# Patient Record
Sex: MALE | Race: ASIAN | NOT HISPANIC OR LATINO | Employment: FULL TIME | URBAN - METROPOLITAN AREA
[De-identification: names, ages, dates, MRNs, and addresses within clinical notes are randomized per-mention and may not be internally consistent; named-entity substitution may affect disease eponyms.]

---

## 2022-01-13 ENCOUNTER — HOSPITAL ENCOUNTER (OUTPATIENT)
Dept: LAB | Facility: MEDICAL CENTER | Age: 52
End: 2022-01-13
Attending: ORTHOPAEDIC SURGERY
Payer: COMMERCIAL

## 2022-01-13 LAB — URATE SERPL-MCNC: 6.7 MG/DL (ref 2.5–8.3)

## 2022-01-13 PROCEDURE — 36415 COLL VENOUS BLD VENIPUNCTURE: CPT

## 2022-01-13 PROCEDURE — 84550 ASSAY OF BLOOD/URIC ACID: CPT

## 2024-02-03 ENCOUNTER — HOSPITAL ENCOUNTER (OUTPATIENT)
Facility: MEDICAL CENTER | Age: 54
End: 2024-02-04
Attending: STUDENT IN AN ORGANIZED HEALTH CARE EDUCATION/TRAINING PROGRAM | Admitting: INTERNAL MEDICINE
Payer: COMMERCIAL

## 2024-02-03 ENCOUNTER — APPOINTMENT (OUTPATIENT)
Dept: RADIOLOGY | Facility: MEDICAL CENTER | Age: 54
End: 2024-02-03
Attending: STUDENT IN AN ORGANIZED HEALTH CARE EDUCATION/TRAINING PROGRAM
Payer: COMMERCIAL

## 2024-02-03 DIAGNOSIS — I16.0 HYPERTENSIVE URGENCY: ICD-10-CM

## 2024-02-03 DIAGNOSIS — I77.73 RENAL ARTERY DISSECTION (HCC): ICD-10-CM

## 2024-02-03 DIAGNOSIS — R07.9 CHEST PAIN, UNSPECIFIED TYPE: ICD-10-CM

## 2024-02-03 LAB
ALBUMIN SERPL BCP-MCNC: 4.9 G/DL (ref 3.2–4.9)
ALBUMIN/GLOB SERPL: 1.5 G/DL
ALP SERPL-CCNC: 70 U/L (ref 30–99)
ALT SERPL-CCNC: 22 U/L (ref 2–50)
ANION GAP SERPL CALC-SCNC: 16 MMOL/L (ref 7–16)
AST SERPL-CCNC: 39 U/L (ref 12–45)
BASOPHILS # BLD AUTO: 0.8 % (ref 0–1.8)
BASOPHILS # BLD: 0.08 K/UL (ref 0–0.12)
BILIRUB SERPL-MCNC: 0.5 MG/DL (ref 0.1–1.5)
BUN SERPL-MCNC: 17 MG/DL (ref 8–22)
CALCIUM ALBUM COR SERPL-MCNC: 8.9 MG/DL (ref 8.5–10.5)
CALCIUM SERPL-MCNC: 9.6 MG/DL (ref 8.4–10.2)
CHLORIDE SERPL-SCNC: 95 MMOL/L (ref 96–112)
CO2 SERPL-SCNC: 24 MMOL/L (ref 20–33)
CREAT SERPL-MCNC: 0.87 MG/DL (ref 0.5–1.4)
EKG IMPRESSION: NORMAL
EOSINOPHIL # BLD AUTO: 0.04 K/UL (ref 0–0.51)
EOSINOPHIL NFR BLD: 0.4 % (ref 0–6.9)
ERYTHROCYTE [DISTWIDTH] IN BLOOD BY AUTOMATED COUNT: 39.4 FL (ref 35.9–50)
GFR SERPLBLD CREATININE-BSD FMLA CKD-EPI: 103 ML/MIN/1.73 M 2
GLOBULIN SER CALC-MCNC: 3.2 G/DL (ref 1.9–3.5)
GLUCOSE SERPL-MCNC: 90 MG/DL (ref 65–99)
HCT VFR BLD AUTO: 48.1 % (ref 42–52)
HGB BLD-MCNC: 16.6 G/DL (ref 14–18)
IMM GRANULOCYTES # BLD AUTO: 0.06 K/UL (ref 0–0.11)
IMM GRANULOCYTES NFR BLD AUTO: 0.6 % (ref 0–0.9)
LYMPHOCYTES # BLD AUTO: 1.55 K/UL (ref 1–4.8)
LYMPHOCYTES NFR BLD: 15.2 % (ref 22–41)
MCH RBC QN AUTO: 32.7 PG (ref 27–33)
MCHC RBC AUTO-ENTMCNC: 34.5 G/DL (ref 32.3–36.5)
MCV RBC AUTO: 94.7 FL (ref 81.4–97.8)
MONOCYTES # BLD AUTO: 0.73 K/UL (ref 0–0.85)
MONOCYTES NFR BLD AUTO: 7.1 % (ref 0–13.4)
NEUTROPHILS # BLD AUTO: 7.76 K/UL (ref 1.82–7.42)
NEUTROPHILS NFR BLD: 75.9 % (ref 44–72)
NRBC # BLD AUTO: 0 K/UL
NRBC BLD-RTO: 0 /100 WBC (ref 0–0.2)
PLATELET # BLD AUTO: 242 K/UL (ref 164–446)
PMV BLD AUTO: 8.8 FL (ref 9–12.9)
POTASSIUM SERPL-SCNC: 3.7 MMOL/L (ref 3.6–5.5)
PROT SERPL-MCNC: 8.1 G/DL (ref 6–8.2)
RBC # BLD AUTO: 5.08 M/UL (ref 4.7–6.1)
SODIUM SERPL-SCNC: 135 MMOL/L (ref 135–145)
TROPONIN T SERPL-MCNC: 6 NG/L (ref 6–19)
TROPONIN T SERPL-MCNC: 6 NG/L (ref 6–19)
WBC # BLD AUTO: 10.2 K/UL (ref 4.8–10.8)

## 2024-02-03 PROCEDURE — 96374 THER/PROPH/DIAG INJ IV PUSH: CPT

## 2024-02-03 PROCEDURE — 36415 COLL VENOUS BLD VENIPUNCTURE: CPT

## 2024-02-03 PROCEDURE — 700111 HCHG RX REV CODE 636 W/ 250 OVERRIDE (IP): Performed by: STUDENT IN AN ORGANIZED HEALTH CARE EDUCATION/TRAINING PROGRAM

## 2024-02-03 PROCEDURE — G0378 HOSPITAL OBSERVATION PER HR: HCPCS

## 2024-02-03 PROCEDURE — A9270 NON-COVERED ITEM OR SERVICE: HCPCS | Performed by: STUDENT IN AN ORGANIZED HEALTH CARE EDUCATION/TRAINING PROGRAM

## 2024-02-03 PROCEDURE — 99223 1ST HOSP IP/OBS HIGH 75: CPT | Performed by: INTERNAL MEDICINE

## 2024-02-03 PROCEDURE — 99285 EMERGENCY DEPT VISIT HI MDM: CPT

## 2024-02-03 PROCEDURE — 84484 ASSAY OF TROPONIN QUANT: CPT | Mod: 91

## 2024-02-03 PROCEDURE — 96376 TX/PRO/DX INJ SAME DRUG ADON: CPT

## 2024-02-03 PROCEDURE — 93005 ELECTROCARDIOGRAM TRACING: CPT

## 2024-02-03 PROCEDURE — 85025 COMPLETE CBC W/AUTO DIFF WBC: CPT

## 2024-02-03 PROCEDURE — 71275 CT ANGIOGRAPHY CHEST: CPT

## 2024-02-03 PROCEDURE — 93005 ELECTROCARDIOGRAM TRACING: CPT | Performed by: STUDENT IN AN ORGANIZED HEALTH CARE EDUCATION/TRAINING PROGRAM

## 2024-02-03 PROCEDURE — 80053 COMPREHEN METABOLIC PANEL: CPT

## 2024-02-03 PROCEDURE — 700102 HCHG RX REV CODE 250 W/ 637 OVERRIDE(OP): Performed by: STUDENT IN AN ORGANIZED HEALTH CARE EDUCATION/TRAINING PROGRAM

## 2024-02-03 PROCEDURE — 700117 HCHG RX CONTRAST REV CODE 255: Performed by: STUDENT IN AN ORGANIZED HEALTH CARE EDUCATION/TRAINING PROGRAM

## 2024-02-03 RX ORDER — LABETALOL HYDROCHLORIDE 5 MG/ML
20 INJECTION, SOLUTION INTRAVENOUS EVERY 4 HOURS PRN
Status: DISCONTINUED | OUTPATIENT
Start: 2024-02-03 | End: 2024-02-04 | Stop reason: HOSPADM

## 2024-02-03 RX ORDER — ASPIRIN 300 MG/1
300 SUPPOSITORY RECTAL DAILY
Status: DISCONTINUED | OUTPATIENT
Start: 2024-02-04 | End: 2024-02-04 | Stop reason: HOSPADM

## 2024-02-03 RX ORDER — PROMETHAZINE HYDROCHLORIDE 25 MG/1
12.5-25 SUPPOSITORY RECTAL EVERY 4 HOURS PRN
Status: DISCONTINUED | OUTPATIENT
Start: 2024-02-03 | End: 2024-02-04 | Stop reason: HOSPADM

## 2024-02-03 RX ORDER — LABETALOL HYDROCHLORIDE 5 MG/ML
20 INJECTION, SOLUTION INTRAVENOUS ONCE
Status: COMPLETED | OUTPATIENT
Start: 2024-02-03 | End: 2024-02-03

## 2024-02-03 RX ORDER — ASPIRIN 325 MG
325 TABLET ORAL ONCE
Status: DISCONTINUED | OUTPATIENT
Start: 2024-02-03 | End: 2024-02-03

## 2024-02-03 RX ORDER — NITROGLYCERIN 0.4 MG/1
0.4 TABLET SUBLINGUAL
Status: DISCONTINUED | OUTPATIENT
Start: 2024-02-03 | End: 2024-02-04 | Stop reason: HOSPADM

## 2024-02-03 RX ORDER — LABETALOL HYDROCHLORIDE 5 MG/ML
10 INJECTION, SOLUTION INTRAVENOUS ONCE
Status: COMPLETED | OUTPATIENT
Start: 2024-02-03 | End: 2024-02-03

## 2024-02-03 RX ORDER — ONDANSETRON 4 MG/1
4 TABLET, ORALLY DISINTEGRATING ORAL EVERY 4 HOURS PRN
Status: DISCONTINUED | OUTPATIENT
Start: 2024-02-03 | End: 2024-02-04 | Stop reason: HOSPADM

## 2024-02-03 RX ORDER — PROCHLORPERAZINE EDISYLATE 5 MG/ML
5-10 INJECTION INTRAMUSCULAR; INTRAVENOUS EVERY 4 HOURS PRN
Status: DISCONTINUED | OUTPATIENT
Start: 2024-02-03 | End: 2024-02-04 | Stop reason: HOSPADM

## 2024-02-03 RX ORDER — LISINOPRIL 5 MG/1
10 TABLET ORAL
Status: DISCONTINUED | OUTPATIENT
Start: 2024-02-04 | End: 2024-02-04 | Stop reason: HOSPADM

## 2024-02-03 RX ORDER — PROMETHAZINE HYDROCHLORIDE 25 MG/1
12.5-25 TABLET ORAL EVERY 4 HOURS PRN
Status: DISCONTINUED | OUTPATIENT
Start: 2024-02-03 | End: 2024-02-04 | Stop reason: HOSPADM

## 2024-02-03 RX ORDER — NITROGLYCERIN 0.4 MG/1
0.4 TABLET SUBLINGUAL ONCE
Status: COMPLETED | OUTPATIENT
Start: 2024-02-03 | End: 2024-02-03

## 2024-02-03 RX ORDER — CARVEDILOL 6.25 MG/1
6.25 TABLET ORAL 2 TIMES DAILY WITH MEALS
Status: DISCONTINUED | OUTPATIENT
Start: 2024-02-04 | End: 2024-02-04 | Stop reason: HOSPADM

## 2024-02-03 RX ORDER — ASPIRIN 81 MG/1
324 TABLET, CHEWABLE ORAL DAILY
Status: DISCONTINUED | OUTPATIENT
Start: 2024-02-04 | End: 2024-02-04 | Stop reason: HOSPADM

## 2024-02-03 RX ORDER — ASPIRIN 325 MG
325 TABLET ORAL ONCE
Status: COMPLETED | OUTPATIENT
Start: 2024-02-03 | End: 2024-02-03

## 2024-02-03 RX ORDER — ENALAPRILAT 1.25 MG/ML
1.25 INJECTION INTRAVENOUS EVERY 6 HOURS PRN
Status: DISCONTINUED | OUTPATIENT
Start: 2024-02-03 | End: 2024-02-04

## 2024-02-03 RX ORDER — ACETAMINOPHEN 325 MG/1
650 TABLET ORAL EVERY 6 HOURS PRN
Status: DISCONTINUED | OUTPATIENT
Start: 2024-02-03 | End: 2024-02-04 | Stop reason: HOSPADM

## 2024-02-03 RX ORDER — ONDANSETRON 2 MG/ML
4 INJECTION INTRAMUSCULAR; INTRAVENOUS EVERY 4 HOURS PRN
Status: DISCONTINUED | OUTPATIENT
Start: 2024-02-03 | End: 2024-02-04 | Stop reason: HOSPADM

## 2024-02-03 RX ORDER — ASPIRIN 325 MG
325 TABLET ORAL DAILY
Status: DISCONTINUED | OUTPATIENT
Start: 2024-02-04 | End: 2024-02-04 | Stop reason: HOSPADM

## 2024-02-03 RX ADMIN — ASPIRIN 325 MG: 325 TABLET ORAL at 23:27

## 2024-02-03 RX ADMIN — LABETALOL HYDROCHLORIDE 10 MG: 5 INJECTION, SOLUTION INTRAVENOUS at 22:28

## 2024-02-03 RX ADMIN — IOHEXOL 100 ML: 350 INJECTION, SOLUTION INTRAVENOUS at 21:51

## 2024-02-03 RX ADMIN — LABETALOL HYDROCHLORIDE 20 MG: 5 INJECTION, SOLUTION INTRAVENOUS at 22:59

## 2024-02-03 RX ADMIN — NITROGLYCERIN 0.4 MG: 0.4 TABLET, ORALLY DISINTEGRATING SUBLINGUAL at 21:37

## 2024-02-04 ENCOUNTER — APPOINTMENT (OUTPATIENT)
Dept: RADIOLOGY | Facility: MEDICAL CENTER | Age: 54
End: 2024-02-04
Attending: INTERNAL MEDICINE
Payer: COMMERCIAL

## 2024-02-04 VITALS
RESPIRATION RATE: 15 BRPM | WEIGHT: 147.71 LBS | SYSTOLIC BLOOD PRESSURE: 126 MMHG | HEART RATE: 74 BPM | HEIGHT: 68 IN | OXYGEN SATURATION: 95 % | DIASTOLIC BLOOD PRESSURE: 78 MMHG | BODY MASS INDEX: 22.39 KG/M2 | TEMPERATURE: 98.2 F

## 2024-02-04 PROBLEM — R07.9 CHEST PAIN: Status: RESOLVED | Noted: 2024-02-03 | Resolved: 2024-02-04

## 2024-02-04 PROBLEM — I16.0 HYPERTENSIVE URGENCY: Status: RESOLVED | Noted: 2024-02-04 | Resolved: 2024-02-04

## 2024-02-04 PROBLEM — F10.10 ALCOHOL ABUSE: Status: ACTIVE | Noted: 2024-02-04

## 2024-02-04 PROBLEM — I77.73 RENAL ARTERY DISSECTION (HCC): Status: ACTIVE | Noted: 2024-02-04

## 2024-02-04 PROBLEM — I16.0 HYPERTENSIVE URGENCY: Status: ACTIVE | Noted: 2024-02-04

## 2024-02-04 LAB
CHOLEST SERPL-MCNC: 215 MG/DL (ref 100–199)
HDLC SERPL-MCNC: 64 MG/DL
LDLC SERPL CALC-MCNC: 90 MG/DL
TRIGL SERPL-MCNC: 303 MG/DL (ref 0–149)
TROPONIN T SERPL-MCNC: 8 NG/L (ref 6–19)

## 2024-02-04 PROCEDURE — 84484 ASSAY OF TROPONIN QUANT: CPT

## 2024-02-04 PROCEDURE — A9502 TC99M TETROFOSMIN: HCPCS

## 2024-02-04 PROCEDURE — A9270 NON-COVERED ITEM OR SERVICE: HCPCS | Performed by: INTERNAL MEDICINE

## 2024-02-04 PROCEDURE — 700111 HCHG RX REV CODE 636 W/ 250 OVERRIDE (IP): Performed by: INTERNAL MEDICINE

## 2024-02-04 PROCEDURE — G0378 HOSPITAL OBSERVATION PER HR: HCPCS

## 2024-02-04 PROCEDURE — 93018 CV STRESS TEST I&R ONLY: CPT | Performed by: INTERNAL MEDICINE

## 2024-02-04 PROCEDURE — 96375 TX/PRO/DX INJ NEW DRUG ADDON: CPT

## 2024-02-04 PROCEDURE — 80061 LIPID PANEL: CPT

## 2024-02-04 PROCEDURE — 78452 HT MUSCLE IMAGE SPECT MULT: CPT | Mod: 26 | Performed by: INTERNAL MEDICINE

## 2024-02-04 PROCEDURE — 94760 N-INVAS EAR/PLS OXIMETRY 1: CPT

## 2024-02-04 PROCEDURE — 99239 HOSP IP/OBS DSCHRG MGMT >30: CPT | Performed by: INTERNAL MEDICINE

## 2024-02-04 PROCEDURE — 700102 HCHG RX REV CODE 250 W/ 637 OVERRIDE(OP): Performed by: INTERNAL MEDICINE

## 2024-02-04 PROCEDURE — 36415 COLL VENOUS BLD VENIPUNCTURE: CPT

## 2024-02-04 RX ORDER — ENOXAPARIN SODIUM 100 MG/ML
40 INJECTION SUBCUTANEOUS DAILY
Status: DISCONTINUED | OUTPATIENT
Start: 2024-02-04 | End: 2024-02-04 | Stop reason: HOSPADM

## 2024-02-04 RX ORDER — CARVEDILOL 6.25 MG/1
6.25 TABLET ORAL 2 TIMES DAILY WITH MEALS
Qty: 60 TABLET | Refills: 0 | Status: SHIPPED | OUTPATIENT
Start: 2024-02-04 | End: 2024-02-06 | Stop reason: SDUPTHER

## 2024-02-04 RX ORDER — LISINOPRIL 10 MG/1
10 TABLET ORAL DAILY
Qty: 30 TABLET | Refills: 0 | Status: SHIPPED | OUTPATIENT
Start: 2024-02-05 | End: 2024-02-06 | Stop reason: SDUPTHER

## 2024-02-04 RX ORDER — ENALAPRILAT 1.25 MG/ML
1.25 INJECTION INTRAVENOUS EVERY 6 HOURS PRN
Status: DISCONTINUED | OUTPATIENT
Start: 2024-02-04 | End: 2024-02-04 | Stop reason: HOSPADM

## 2024-02-04 RX ORDER — LISINOPRIL 10 MG/1
10 TABLET ORAL DAILY
Qty: 30 TABLET | Refills: 0 | Status: SHIPPED | OUTPATIENT
Start: 2024-02-05 | End: 2024-02-04

## 2024-02-04 RX ORDER — CARVEDILOL 6.25 MG/1
6.25 TABLET ORAL 2 TIMES DAILY WITH MEALS
Qty: 60 TABLET | Refills: 0 | Status: SHIPPED | OUTPATIENT
Start: 2024-02-04 | End: 2024-02-04

## 2024-02-04 RX ADMIN — LISINOPRIL 10 MG: 5 TABLET ORAL at 00:01

## 2024-02-04 RX ADMIN — ENALAPRILAT 1.25 MG: 1.25 INJECTION INTRAVENOUS at 01:02

## 2024-02-04 ASSESSMENT — ENCOUNTER SYMPTOMS
STRIDOR: 0
CHILLS: 0
TINGLING: 0
NAUSEA: 0
PALPITATIONS: 0
HEADACHES: 0
DIZZINESS: 0
FALLS: 0
DEPRESSION: 0
WEAKNESS: 0
SPUTUM PRODUCTION: 0
SHORTNESS OF BREATH: 0
CONSTIPATION: 0
VOMITING: 0
COUGH: 0
ABDOMINAL PAIN: 0
DIARRHEA: 0
MYALGIAS: 0
LOSS OF CONSCIOUSNESS: 0
FEVER: 0

## 2024-02-04 ASSESSMENT — PAIN DESCRIPTION - PAIN TYPE
TYPE: ACUTE PAIN
TYPE: ACUTE PAIN

## 2024-02-04 ASSESSMENT — COGNITIVE AND FUNCTIONAL STATUS - GENERAL
SUGGESTED CMS G CODE MODIFIER MOBILITY: CH
DAILY ACTIVITIY SCORE: 24
MOBILITY SCORE: 24
SUGGESTED CMS G CODE MODIFIER DAILY ACTIVITY: CH

## 2024-02-04 ASSESSMENT — LIFESTYLE VARIABLES
AVERAGE NUMBER OF DAYS PER WEEK YOU HAVE A DRINK CONTAINING ALCOHOL: 6
EVER HAD A DRINK FIRST THING IN THE MORNING TO STEADY YOUR NERVES TO GET RID OF A HANGOVER: NO
TOTAL SCORE: 2
CONSUMPTION TOTAL: POSITIVE
HAVE PEOPLE ANNOYED YOU BY CRITICIZING YOUR DRINKING: NO
TOTAL SCORE: 2
HAVE YOU EVER FELT YOU SHOULD CUT DOWN ON YOUR DRINKING: YES
EVER FELT BAD OR GUILTY ABOUT YOUR DRINKING: YES
ALCOHOL_USE: YES
TOTAL SCORE: 2
HOW MANY TIMES IN THE PAST YEAR HAVE YOU HAD 5 OR MORE DRINKS IN A DAY: 30
ON A TYPICAL DAY WHEN YOU DRINK ALCOHOL HOW MANY DRINKS DO YOU HAVE: 3

## 2024-02-04 ASSESSMENT — FIBROSIS 4 INDEX
FIB4 SCORE: 1.82
FIB4 SCORE: 1.82

## 2024-02-04 ASSESSMENT — PATIENT HEALTH QUESTIONNAIRE - PHQ9
SUM OF ALL RESPONSES TO PHQ9 QUESTIONS 1 AND 2: 0
1. LITTLE INTEREST OR PLEASURE IN DOING THINGS: NOT AT ALL
2. FEELING DOWN, DEPRESSED, IRRITABLE, OR HOPELESS: NOT AT ALL

## 2024-02-04 NOTE — DISCHARGE SUMMARY
"Discharge Summary    CHIEF COMPLAINT ON ADMISSION  Chief Complaint   Patient presents with    Chest Pressure     Pt Aox4, ambulatory, report chest pressure on upper left side started 3pm today while working. Took his BP and it was high. Denies dizziness, weakness. No N/V.        Reason for Admission  Chest Pressure     Admission Date  2/3/2024    CODE STATUS  Full Code    HPI & HOSPITAL COURSE  Billy Ewing is a 53 y.o. male admitted 2/3/2024 with chest pressure. Troponin was negative x 3 . EKG showed sinus rhythm with no signs of acute ischemia.    CTA chest from 2/3/2024 showed ascending thoracic aortic ectasia measuring 4.2 cm in diameter, no thoracic or abdominal aortic dissection, and focal nonocclusive dissection involving LEFT renal artery. Per chart, the ER physician discussed the case with  Vascular Surgery who recommend keeping the systolic blood pressure closer to 140. Coreg and Lisinopril were started.     NM stress test from today showed \"no evidence of significant jeopardized viable myocardium or prior myocardial infarction. Normal left ventricular size, ejection fraction, and wall motion   ...Negative stress ECG for ischemia\"    Patient has not has chest pain since admission. He is afebrile and hemodynamically stable.    Patient has been advised to obtain a blood pressure machine and record his blood pressure at least 3 times a day and provide the readings to his primary care physician    Patient has been advised to follow up with his PCP in 2-3 days.    Therefore, he is discharged in fair and stable condition to home with close outpatient follow-up.      Discharge Date  2/4/2024    FOLLOW UP ITEMS POST DISCHARGE  PCP in 2-3 days    DISCHARGE DIAGNOSES  Principal Problem (Resolved):    Chest pain (POA: Yes)  Active Problems:    Renal artery dissection (HCC) (POA: Yes)    Alcohol abuse (POA: Yes)  Resolved Problems:    Hypertensive urgency (POA: Yes)      FOLLOW UP  No future appointments.  No " follow-up provider specified.    MEDICATIONS ON DISCHARGE     Medication List        START taking these medications        Instructions   carvedilol 6.25 MG Tabs  Commonly known as: Coreg   Take 1 Tablet by mouth 2 times a day with meals.  Dose: 6.25 mg     lisinopril 10 MG Tabs  Start taking on: February 5, 2024  Commonly known as: Prinivil   Take 1 Tablet by mouth every day.  Dose: 10 mg            CONTINUE taking these medications        Instructions   multivitamin Tabs   Take 1 Tablet by mouth every day.  Dose: 1 Tablet              Allergies  No Known Allergies    DIET  Orders Placed This Encounter   Procedures    Diet Order Diet: Cardiac; Miscellaneous modifications: (optional): No Decaf, No Caffeine(for test)     Standing Status:   Standing     Number of Occurrences:   1     Order Specific Question:   Diet:     Answer:   Cardiac [6]     Order Specific Question:   Miscellaneous modifications: (optional)     Answer:   No Decaf, No Caffeine(for test) [11]       ACTIVITY  As tolerated.      CONSULTATIONS  None    PROCEDURES  NM stress test    LABORATORY  Lab Results   Component Value Date    SODIUM 135 02/03/2024    POTASSIUM 3.7 02/03/2024    CHLORIDE 95 (L) 02/03/2024    CO2 24 02/03/2024    GLUCOSE 90 02/03/2024    BUN 17 02/03/2024    CREATININE 0.87 02/03/2024        Lab Results   Component Value Date    WBC 10.2 02/03/2024    HEMOGLOBIN 16.6 02/03/2024    HEMATOCRIT 48.1 02/03/2024    PLATELETCT 242 02/03/2024        Total time of the discharge process exceeds 38 minutes.

## 2024-02-04 NOTE — ASSESSMENT & PLAN NOTE
Patient systolic blood pressure was nearly 200 initially  Has improved with as needed treatment  ERP did discuss the case with vascular surgery who recommended keeping the systolic blood pressure closer to 140  Because of this, I will start scheduled lisinopril and Coreg  Start as needed labetalol and enalapril  Adjust as needed

## 2024-02-04 NOTE — ASSESSMENT & PLAN NOTE
Patient states he drinks 3-4 alcoholic beverages a day  No history of withdrawal  I think it is less likely that this is esophagitis from alcohol due to the resolution of the pain as well as the description of the pain however I will start as needed GI cocktail  Monitor for withdrawal, no sign at this time

## 2024-02-04 NOTE — PROGRESS NOTES
Medication history reviewed with pt. Med rec is complete.  Allergies reviewed, per pt    Pt reports no prescription medications in the last 30 days or longer.    Patient has not had any outpatient antibiotics in the last 30 days.    Pt is not on any anticoagulants

## 2024-02-04 NOTE — ED PROVIDER NOTES
ED Provider Note    CHIEF COMPLAINT  Chief Complaint   Patient presents with    Chest Pressure     Pt Aox4, ambulatory, report chest pressure on upper left side started 3pm today while working. Took his BP and it was high. Denies dizziness, weakness. No N/V.        EXTERNAL RECORDS REVIEWED  Outpatient Notes urgent care visit from 12/12/2023 where patient was evaluated for rash vital signs notable for hypertension    HPI/ROS  LIMITATION TO HISTORY     OUTSIDE HISTORIAN(S):      Billy Ewing is a 53 y.o. male who presents with chest pressure.  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  Patient says that pain started around 3 PM.  Patient says that pain was more severe earlier has improved but is ongoing.  Patient says he did have some diaphoresis but was in a kitchen.  Patient denies vomiting, shortness of breath, headache, neck pain, back pain, pain radiation, abdominal pain.  Patient denies previous history of similar symptoms.  Patient denies tobacco use.  Patient says he does not go to the doctor no known medical problems.  Patient denies drug use.  Patient denies recent trauma.  Patient says that he believes his mother had cardiac bypass when she was in her 70s.    PAST MEDICAL HISTORY       SURGICAL HISTORY  patient denies any surgical history    FAMILY HISTORY  History reviewed. No pertinent family history.    SOCIAL HISTORY  Social History     Tobacco Use    Smoking status: Never    Smokeless tobacco: Never   Vaping Use    Vaping Use: Never used   Substance and Sexual Activity    Alcohol use: Yes     Alcohol/week: 15.6 oz     Types: 21 Glasses of wine, 5 Cans of beer per week     Comment: drinks everyday    Drug use: Never    Sexual activity: Not on file       CURRENT MEDICATIONS  Home Medications       Reviewed by Lenka Mart R.N. (Registered Nurse) on 02/03/24 at 2131  Med List Status: Complete     Medication Last Dose Status       "  Patient Aayush Taking any Medications                           ALLERGIES  No Known Allergies    PHYSICAL EXAM  VITAL SIGNS: BP (!) 170/106   Pulse 77   Temp 37.4 °C (99.4 °F) (Temporal)   Resp (!) 23   Ht 1.727 m (5' 8\")   Wt 68.8 kg (151 lb 10.8 oz)   SpO2 95%   BMI 23.06 kg/m²    Constitutional: Alert in no apparent distress.  HENT: No signs of trauma, Bilateral external ears normal, Nose normal.   Eyes: Pupils are equal and reactive, Conjunctiva normal, Non-icteric.   Neck: Normal range of motion, No tenderness, Supple, No stridor.   Cardiovascular: Regular rate and rhythm, no murmurs.   Thorax & Lungs: Normal breath sounds, No respiratory distress, No wheezing, No chest tenderness.   Abdomen: Bowel sounds normal, Soft, No tenderness, No masses, No pulsatile masses.   Skin: Warm, Dry, No erythema, No rash.   Back: No bony tenderness, No CVA tenderness.   Extremities: Intact distal pulses, No edema, No tenderness, No cyanosis  Musculoskeletal: Good range of motion in all major joints. No tenderness to palpation or major deformities noted.   Neurologic: Alert , Normal motor function, Normal sensory function, No focal deficits noted.       DIAGNOSTIC STUDIES / PROCEDURES  EKG  I have independently interpreted this EKG  Results for orders placed or performed during the hospital encounter of 24   EKG   Result Value Ref Range    Report       [facility] Emergency Dept.    Test Date:  2024  Pt Name:    MARYBETH SANCHEZ              Department: [facility]  MRN:        [id number]                      Room:  Gender:     Male                         Technician: FABIANA  :        1970                   Requested By:ER TRIAGE PROTOCOL  Order #:    [id number]                    Reading MD: Daryl Roman    Measurements  Intervals                                Axis  Rate:       88                           P:          68  WV:         177                          QRS:        " -8  QRSD:       103                          T:          32  QT:         372  QTc:        450    Interpretive Statements    Interpreted by me: Sinus rhythm, rate 88, slight prolongation of QRS, normal  QTc, mild ST segment sagging and V4 through V6 otherwise no signs of acute  ischemia  Electronically Signed On 02- 21:33:25 PST by Daryl Roman           LABS  Labs Reviewed   CBC WITH DIFFERENTIAL - Abnormal; Notable for the following components:       Result Value    MPV 8.8 (*)     Neutrophils-Polys 75.90 (*)     Lymphocytes 15.20 (*)     Neutrophils (Absolute) 7.76 (*)     All other components within normal limits    Narrative:     Biotin intake of greater than 5 mg per day may interfere with  troponin levels, causing false low values.   COMP METABOLIC PANEL - Abnormal; Notable for the following components:    Chloride 95 (*)     All other components within normal limits    Narrative:     Biotin intake of greater than 5 mg per day may interfere with  troponin levels, causing false low values.   TROPONIN    Narrative:     Biotin intake of greater than 5 mg per day may interfere with  troponin levels, causing false low values.   ESTIMATED GFR    Narrative:     Biotin intake of greater than 5 mg per day may interfere with  troponin levels, causing false low values.   TROPONIN    Narrative:     Biotin intake of greater than 5 mg per day may interfere with  troponin levels, causing false low values.   TROPONIN   LIPID PROFILE         RADIOLOGY  I have independently interpreted the diagnostic imaging associated with this visit and am waiting the final reading from the radiologist.   My preliminary interpretation is as follows: No free air  Radiologist interpretation:   CT-CTA COMPLETE THORACOABDOMINAL AORTA   Final Result      1.  Ascending thoracic aortic ectasia measuring 4.2 cm in diameter.   2.  No thoracic or abdominal aortic dissection.   3.  Focal nonocclusive dissection involving LEFT renal artery.    4.  Scarring at the midportion LEFT kidney.   5.  No gross evidence for pulmonary emboli.               NM-CARDIAC STRESS TEST    (Results Pending)         COURSE & MEDICAL DECISION MAKING    ED Observation Status?     INITIAL ASSESSMENT, COURSE AND PLAN  Care Narrative: On arrival patient noted to be hypertensive vital signs otherwise within normal limits. ECG possible signs of LVH slight ST segment sagging in precordial leads.  Differential includes ACS, dissection, PE.  Will obtain CTA to assess for dissection and central PE.  Patient not hypoxemic, tachycardic no signs of DVT overall lower suspicion for PE.  Patient without known risk factors for ACS but has not seen a doctor in decades.  Will trial dose of nitroglycerin given patient's prominent hypertension.    On reassessment patient reports improvement in chest pain after nitroglycerin.  Patient is still significantly hypertensive.  Patient given dose of IV labetalol with minimal improvement initially.  CTA resulted with signs of aortic ectasia no signs of dissection does show nonocclusive dissection of left renal artery with signs of scarring.  Suspect this is chronic as patient has normal kidney function no flank pain or abdominal pain.  Spoke with vascular surgery who agreed that this is likely chronic finding does not recommend anticoagulation does recommend blood pressure control less than 140 ideally no need for IV infusions as needed medications adequate.  Given patient's prominent hypertension no previous evaluations for chest pain spoke with hospitalist regarding admission for chest pain rule out as well as further monitoring for renal artery dissection.  After second dose of labetalol patient with adequate blood pressure control.    CRITICAL CARE  The very real possibilty of a deterioration of this patient's condition required the highest level of my preparedness for sudden, emergent intervention.  I provided critical care services, which  included medication orders, frequent reevaluations of the patient's condition and response to treatment, ordering and reviewing test results, and discussing the case with various consultants.  The critical care time associated with the care of the patient was 34 minutes. Review chart for interventions. This time is exclusive of any other billable procedures.             ADDITIONAL PROBLEM LIST    DISPOSITION AND DISCUSSIONS  I have discussed management of the patient with the following physicians and YUDELKA's: Vascular surgery, hospitalist      FINAL DIAGNOSIS  1. Chest pain, unspecified type           Electronically signed by: Daryl Roman D.O., 2/3/2024 9:23 PM

## 2024-02-04 NOTE — DISCHARGE INSTRUCTIONS
Please start Lisinopril 10 mg daily, and Coreg 6.25 mg every 12 hours.     Please obtain a blood pressure machine and record your blood pressure at least 3 times a day and provide the readings to your primary care physician. Note, your systolic blood pressure (upper reading) should be below 140 and above 100    Please follow up with your Primary Care Physician in 2-3 days

## 2024-02-04 NOTE — PROGRESS NOTES
This RN called scheduling at ext [phone] and left a voicemail to call patient back to schedule as patient does not currently have a PCP. Patient stated they are ok with being set up with anyone that is available the soonest.

## 2024-02-04 NOTE — CARE PLAN
The patient is Watcher - Medium risk of patient condition declining or worsening         Progress made toward(s) clinical / shift goals:  pt. Reports no pain, SOB, cough, headache or dizziness.  BP remain elevated, PRN's given.      Problem: Knowledge Deficit - Standard  Goal: Patient and family/care givers will demonstrate understanding of plan of care, disease process/condition, diagnostic tests and medications  Outcome: Progressing     Problem: Pain - Standard  Goal: Alleviation of pain or a reduction in pain to the patient’s comfort goal  Outcome: Progressing     Problem: Psychosocial  Goal: Patient's level of anxiety will decrease  Outcome: Progressing  Goal: Patient's ability to verbalize feelings about condition will improve  Outcome: Progressing  Goal: Patient's ability to re-evaluate and adapt role responsibilities will improve  Outcome: Progressing  Goal: Patient and family will demonstrate ability to cope with life altering diagnosis and/or procedure  Outcome: Progressing  Goal: Spiritual and cultural needs incorporated into hospitalization  Outcome: Progressing     Problem: Communication  Goal: The ability to communicate needs accurately and effectively will improve  Outcome: Progressing     Problem: Discharge Barriers/Planning  Goal: Patient's continuum of care needs are met  Outcome: Progressing     Problem: Respiratory  Goal: Patient will achieve/maintain optimum respiratory ventilation and gas exchange  Outcome: Progressing     Problem: Infection - Standard  Goal: Patient will remain free from infection  Outcome: Progressing

## 2024-02-04 NOTE — PROGRESS NOTES
Received report from Maddison QUINTERO. Assessment completed. Patient A&O x4. Patient is on room air, Spo2 >90%, no signs of increase work of breathing. Patient reporting 0/10 pain. Reviewed POC, stress test planned for today. Bed is locked and in lowest position. Call light within reach.  No other needs at this time per patient.

## 2024-02-04 NOTE — PROGRESS NOTES
4 Eyes Skin Assessment Completed by shakir martin, RN and Maddison waite, RN.    Head WDL  Ears WDL  Nose WDL  Mouth WDL  Neck WDL  Breast/Chest WDL  Shoulder Blades WDL rash on back  Spine WDL  (R) Arm/Elbow/Hand WDL  (L) Arm/Elbow/Hand WDL  Abdomen WDL  Groin WDL  Scrotum/Coccyx/Buttocks WDL  (R) Leg WDL  (L) Leg WDL  (R) Heel/Foot/Toe WDL  (L) Heel/Foot/Toe WDL          Devices In Places Tele Box and SCD's      Interventions In Place Pillows    Possible Skin Injury No    Pictures Uploaded Into Epic N/A  Wound Consult Placed N/A  RN Wound Prevention Protocol Ordered No

## 2024-02-04 NOTE — ED TRIAGE NOTES
"Chief Complaint   Patient presents with    Chest Pressure     Pt Aox4, ambulatory, report chest pressure on upper left side started 3pm today while working. Took his BP and it was high. Denies dizziness, weakness. No N/V.      BP (!) 199/118   Pulse 80   Resp 19   Ht 1.727 m (5' 8\")   Wt 68.8 kg (151 lb 10.8 oz)   SpO2 96%   BMI 23.06 kg/m²     "

## 2024-02-04 NOTE — H&P
Hospital Medicine History & Physical Note    Date of Service  2/3/2024    Primary Care Physician  Pcp Pt States None    Consultants  None  however ERP did discuss the case with on call vascular surgery    Specialist Names: None    Code Status  Full Code    Chief Complaint  Chief Complaint   Patient presents with    Chest Pressure     Pt Aox4, ambulatory, report chest pressure on upper left side started 3pm today while working. Took his BP and it was high. Denies dizziness, weakness. No N/V.        History of Presenting Illness  Billy Ewing is a 53 y.o. male who presented 2/3/2024 with chest pressure.  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  Patient states it was moderate in severity, it persisted so he presented to the ER.  By the time he got here it it improved and was only mild, currently resolved.  Patient states he has been under increased stress at work.  States this is the first time he has had pressure like this.  He denied any nausea or vomiting, no shortness of breath.  Patient states approximately 1 year ago he did have a short episode of left-sided kidney pain that was sharp and severe.  I did discuss the case including labs and imaging with the ER physician.    I discussed the plan of care with patient and family.    Review of Systems  Review of Systems   Constitutional:  Negative for chills, fever and malaise/fatigue.   HENT:  Negative for congestion.    Respiratory:  Negative for cough, sputum production, shortness of breath and stridor.    Cardiovascular:  Positive for chest pain. Negative for palpitations and leg swelling.   Gastrointestinal:  Negative for abdominal pain, constipation, diarrhea, nausea and vomiting.   Genitourinary:  Negative for dysuria and urgency.   Musculoskeletal:  Negative for falls and myalgias.   Neurological:  Negative for dizziness, tingling, loss of consciousness, weakness and headaches.   Psychiatric/Behavioral:  Negative for  depression and suicidal ideas.    All other systems reviewed and are negative.      Past Medical History   has no past medical history on file.    Surgical History   has no past surgical history on file.     Family History  family history is not on file.   Family history reviewed with patient. There is family history that is pertinent to the chief complaint.     Social History   reports that he has never smoked. He has never used smokeless tobacco. He reports current alcohol use of about 15.6 oz of alcohol per week. He reports that he does not use drugs.    Allergies  Not on File    Medications  None       Physical Exam  Temp:  [37.4 °C (99.4 °F)] 37.4 °C (99.4 °F)  Pulse:  [] 71  Resp:  [15-20] 16  BP: (149-208)/() 166/102  SpO2:  [93 %-98 %] 93 %  Blood Pressure: (!) 166/102   Temperature: 37.4 °C (99.4 °F)   Pulse: 71   Respiration: 16   Pulse Oximetry: 93 %       Physical Exam  Vitals and nursing note reviewed.   Constitutional:       General: He is not in acute distress.     Appearance: He is well-developed. He is not toxic-appearing or diaphoretic.   HENT:      Head: Normocephalic and atraumatic.      Right Ear: External ear normal.      Left Ear: External ear normal.      Nose: Nose normal. No congestion or rhinorrhea.      Mouth/Throat:      Mouth: Mucous membranes are moist.      Pharynx: No oropharyngeal exudate.   Eyes:      General:         Right eye: No discharge.         Left eye: No discharge.   Neck:      Trachea: No tracheal deviation.   Cardiovascular:      Rate and Rhythm: Normal rate and regular rhythm.      Heart sounds: No murmur heard.     No friction rub. No gallop.   Pulmonary:      Effort: Pulmonary effort is normal. No respiratory distress.      Breath sounds: Normal breath sounds. No stridor. No wheezing or rales.   Chest:      Chest wall: No tenderness.   Abdominal:      General: Bowel sounds are normal. There is no distension.      Palpations: Abdomen is soft.       "Tenderness: There is no abdominal tenderness.   Musculoskeletal:         General: No tenderness. Normal range of motion.      Cervical back: Normal range of motion and neck supple. No edema or erythema.      Right lower leg: No edema.      Left lower leg: No edema.   Lymphadenopathy:      Cervical: No cervical adenopathy.   Skin:     General: Skin is warm and dry.      Findings: No erythema or rash.   Neurological:      Mental Status: He is alert and oriented to person, place, and time.      Cranial Nerves: No cranial nerve deficit.   Psychiatric:         Mood and Affect: Mood normal.         Behavior: Behavior normal.         Thought Content: Thought content normal.         Judgment: Judgment normal.         Laboratory:  Recent Labs     02/03/24 2122   WBC 10.2   RBC 5.08   HEMOGLOBIN 16.6   HEMATOCRIT 48.1   MCV 94.7   MCH 32.7   MCHC 34.5   RDW 39.4   PLATELETCT 242   MPV 8.8*     Recent Labs     02/03/24 2122   SODIUM 135   POTASSIUM 3.7   CHLORIDE 95*   CO2 24   GLUCOSE 90   BUN 17   CREATININE 0.87   CALCIUM 9.6     Recent Labs     02/03/24 2122   ALTSGPT 22   ASTSGOT 39   ALKPHOSPHAT 70   TBILIRUBIN 0.5   GLUCOSE 90         No results for input(s): \"NTPROBNP\" in the last 72 hours.      Recent Labs     02/03/24 2122   TROPONINT 6       Imaging:  CT-CTA COMPLETE THORACOABDOMINAL AORTA   Final Result      1.  Ascending thoracic aortic ectasia measuring 4.2 cm in diameter.   2.  No thoracic or abdominal aortic dissection.   3.  Focal nonocclusive dissection involving LEFT renal artery.   4.  Scarring at the midportion LEFT kidney.   5.  No gross evidence for pulmonary emboli.               NM-CARDIAC STRESS TEST    (Results Pending)       EKG:  I have personally reviewed the images and compared with prior images.    Assessment/Plan:  Justification for Admission Status  I anticipate this patient is appropriate for observation status at this time because chest pain rule out, hypertensive urgency    Patient will " need a Telemetry bed on MEDICAL service .  The need is secondary to chest pain rule out, hypertensive urgency.    * Chest pain- (present on admission)  Assessment & Plan  Described as substernal chest pressure  Associated with significantly elevated blood pressure  Troponin thus far has been normal, continue to trend  Monitor patient on telemetry  Repeat EKG  If no worsening, obtain a stress test in the morning  Medical management in the meantime    Hypertensive urgency- (present on admission)  Assessment & Plan  Patient systolic blood pressure was nearly 200 initially  Has improved with as needed treatment  ERP did discuss the case with vascular surgery who recommended keeping the systolic blood pressure closer to 140  Because of this, I will start scheduled lisinopril and Coreg  Start as needed labetalol and enalapril  Adjust as needed    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)  Assessment & Plan  Noted on CT scan  Also with scarring on the same side  Potentially occurred approximately a year ago and patient had significant pain, no pain since  ERP did discuss the case with vascular surgery who recommended outpatient follow-up for this as well as the ascending thoracic aortic ectasia  Vascular surgery also recommended to keep the systolic blood pressure closer to if not below 140  Because of this, I will start the patient on scheduled lisinopril and Coreg, if his stress test is negative and there is no sign of coronary disease, could transition or include amlodipine  Start as needed labetalol and enalapril  Avoid hydralazine    Alcohol abuse- (present on admission)  Assessment & Plan  Patient states he drinks 3-4 alcoholic beverages a day  No history of withdrawal  I think it is less likely that this is esophagitis from alcohol due to the resolution of the pain as well as the description of the pain however I will start as needed GI cocktail  Monitor for withdrawal, no sign at this time        VTE  prophylaxis: SCDs/TEDs

## 2024-02-04 NOTE — ASSESSMENT & PLAN NOTE
Noted on CT scan  Also with scarring on the same side  Potentially occurred approximately a year ago and patient had significant pain, no pain since  ERP did discuss the case with vascular surgery who recommended outpatient follow-up for this as well as the ascending thoracic aortic ectasia  Vascular surgery also recommended to keep the systolic blood pressure closer to if not below 140  Because of this, I will start the patient on scheduled lisinopril and Coreg, if his stress test is negative and there is no sign of coronary disease, could transition or include amlodipine  Start as needed labetalol and enalapril  Avoid hydralazine

## 2024-02-04 NOTE — PROGRESS NOTES
12-hour chart check complete.    Monitor Summary  Rhythm: SR  Rate: 67-83  Ectopy: N/A  Measurements: 0.18/0.10/0.40

## 2024-02-05 ENCOUNTER — PATIENT OUTREACH (OUTPATIENT)
Dept: SCHEDULING | Facility: IMAGING CENTER | Age: 54
End: 2024-02-05
Payer: COMMERCIAL

## 2024-02-05 SDOH — ECONOMIC STABILITY: TRANSPORTATION INSECURITY
IN THE PAST 12 MONTHS, HAS THE LACK OF TRANSPORTATION KEPT YOU FROM MEDICAL APPOINTMENTS OR FROM GETTING MEDICATIONS?: NO

## 2024-02-05 SDOH — ECONOMIC STABILITY: FOOD INSECURITY: WITHIN THE PAST 12 MONTHS, THE FOOD YOU BOUGHT JUST DIDN'T LAST AND YOU DIDN'T HAVE MONEY TO GET MORE.: NEVER TRUE

## 2024-02-05 SDOH — HEALTH STABILITY: PHYSICAL HEALTH: ON AVERAGE, HOW MANY MINUTES DO YOU ENGAGE IN EXERCISE AT THIS LEVEL?: 20 MIN

## 2024-02-05 SDOH — ECONOMIC STABILITY: FOOD INSECURITY: WITHIN THE PAST 12 MONTHS, YOU WORRIED THAT YOUR FOOD WOULD RUN OUT BEFORE YOU GOT MONEY TO BUY MORE.: NEVER TRUE

## 2024-02-05 SDOH — ECONOMIC STABILITY: HOUSING INSECURITY
IN THE LAST 12 MONTHS, WAS THERE A TIME WHEN YOU DID NOT HAVE A STEADY PLACE TO SLEEP OR SLEPT IN A SHELTER (INCLUDING NOW)?: NO

## 2024-02-05 SDOH — ECONOMIC STABILITY: INCOME INSECURITY: IN THE LAST 12 MONTHS, WAS THERE A TIME WHEN YOU WERE NOT ABLE TO PAY THE MORTGAGE OR RENT ON TIME?: NO

## 2024-02-05 SDOH — ECONOMIC STABILITY: INCOME INSECURITY: HOW HARD IS IT FOR YOU TO PAY FOR THE VERY BASICS LIKE FOOD, HOUSING, MEDICAL CARE, AND HEATING?: NOT VERY HARD

## 2024-02-05 SDOH — ECONOMIC STABILITY: HOUSING INSECURITY: IN THE LAST 12 MONTHS, HOW MANY PLACES HAVE YOU LIVED?: 1

## 2024-02-05 SDOH — HEALTH STABILITY: MENTAL HEALTH
STRESS IS WHEN SOMEONE FEELS TENSE, NERVOUS, ANXIOUS, OR CAN'T SLEEP AT NIGHT BECAUSE THEIR MIND IS TROUBLED. HOW STRESSED ARE YOU?: ONLY A LITTLE

## 2024-02-05 SDOH — HEALTH STABILITY: PHYSICAL HEALTH: ON AVERAGE, HOW MANY DAYS PER WEEK DO YOU ENGAGE IN MODERATE TO STRENUOUS EXERCISE (LIKE A BRISK WALK)?: 2 DAYS

## 2024-02-05 SDOH — ECONOMIC STABILITY: TRANSPORTATION INSECURITY
IN THE PAST 12 MONTHS, HAS LACK OF RELIABLE TRANSPORTATION KEPT YOU FROM MEDICAL APPOINTMENTS, MEETINGS, WORK OR FROM GETTING THINGS NEEDED FOR DAILY LIVING?: NO

## 2024-02-05 SDOH — ECONOMIC STABILITY: TRANSPORTATION INSECURITY
IN THE PAST 12 MONTHS, HAS LACK OF TRANSPORTATION KEPT YOU FROM MEETINGS, WORK, OR FROM GETTING THINGS NEEDED FOR DAILY LIVING?: NO

## 2024-02-05 ASSESSMENT — LIFESTYLE VARIABLES
HOW OFTEN DO YOU HAVE SIX OR MORE DRINKS ON ONE OCCASION: WEEKLY
HOW OFTEN DO YOU HAVE A DRINK CONTAINING ALCOHOL: 4 OR MORE TIMES A WEEK
HOW MANY STANDARD DRINKS CONTAINING ALCOHOL DO YOU HAVE ON A TYPICAL DAY: 3 OR 4
AUDIT-C TOTAL SCORE: 8
SKIP TO QUESTIONS 9-10: 0

## 2024-02-05 ASSESSMENT — SOCIAL DETERMINANTS OF HEALTH (SDOH)
DO YOU BELONG TO ANY CLUBS OR ORGANIZATIONS SUCH AS CHURCH GROUPS UNIONS, FRATERNAL OR ATHLETIC GROUPS, OR SCHOOL GROUPS?: YES
IN A TYPICAL WEEK, HOW MANY TIMES DO YOU TALK ON THE PHONE WITH FAMILY, FRIENDS, OR NEIGHBORS?: MORE THAN THREE TIMES A WEEK
HOW OFTEN DO YOU ATTENT MEETINGS OF THE CLUB OR ORGANIZATION YOU BELONG TO?: MORE THAN 4 TIMES PER YEAR
HOW OFTEN DO YOU HAVE A DRINK CONTAINING ALCOHOL: 4 OR MORE TIMES A WEEK
HOW OFTEN DO YOU ATTEND CHURCH OR RELIGIOUS SERVICES?: NEVER
HOW OFTEN DO YOU ATTENT MEETINGS OF THE CLUB OR ORGANIZATION YOU BELONG TO?: MORE THAN 4 TIMES PER YEAR
HOW HARD IS IT FOR YOU TO PAY FOR THE VERY BASICS LIKE FOOD, HOUSING, MEDICAL CARE, AND HEATING?: NOT VERY HARD
IN A TYPICAL WEEK, HOW MANY TIMES DO YOU TALK ON THE PHONE WITH FAMILY, FRIENDS, OR NEIGHBORS?: MORE THAN THREE TIMES A WEEK
HOW OFTEN DO YOU ATTEND CHURCH OR RELIGIOUS SERVICES?: NEVER
HOW OFTEN DO YOU HAVE SIX OR MORE DRINKS ON ONE OCCASION: WEEKLY
WITHIN THE PAST 12 MONTHS, YOU WORRIED THAT YOUR FOOD WOULD RUN OUT BEFORE YOU GOT THE MONEY TO BUY MORE: NEVER TRUE
HOW OFTEN DO YOU GET TOGETHER WITH FRIENDS OR RELATIVES?: ONCE A WEEK
DO YOU BELONG TO ANY CLUBS OR ORGANIZATIONS SUCH AS CHURCH GROUPS UNIONS, FRATERNAL OR ATHLETIC GROUPS, OR SCHOOL GROUPS?: YES
HOW OFTEN DO YOU GET TOGETHER WITH FRIENDS OR RELATIVES?: ONCE A WEEK
HOW MANY DRINKS CONTAINING ALCOHOL DO YOU HAVE ON A TYPICAL DAY WHEN YOU ARE DRINKING: 3 OR 4

## 2024-02-06 ENCOUNTER — OFFICE VISIT (OUTPATIENT)
Dept: MEDICAL GROUP | Facility: MEDICAL CENTER | Age: 54
End: 2024-02-06
Payer: COMMERCIAL

## 2024-02-06 ENCOUNTER — TELEPHONE (OUTPATIENT)
Dept: HEALTH INFORMATION MANAGEMENT | Facility: OTHER | Age: 54
End: 2024-02-06

## 2024-02-06 VITALS
BODY MASS INDEX: 22.58 KG/M2 | WEIGHT: 149 LBS | OXYGEN SATURATION: 99 % | SYSTOLIC BLOOD PRESSURE: 110 MMHG | HEART RATE: 79 BPM | DIASTOLIC BLOOD PRESSURE: 90 MMHG | HEIGHT: 68 IN | TEMPERATURE: 97.6 F

## 2024-02-06 DIAGNOSIS — Z12.11 SCREENING FOR COLORECTAL CANCER: ICD-10-CM

## 2024-02-06 DIAGNOSIS — I77.73 RENAL ARTERY DISSECTION (HCC): ICD-10-CM

## 2024-02-06 DIAGNOSIS — Z12.12 SCREENING FOR COLORECTAL CANCER: ICD-10-CM

## 2024-02-06 DIAGNOSIS — I77.819 AORTIC ECTASIA (HCC): ICD-10-CM

## 2024-02-06 DIAGNOSIS — I16.0 HYPERTENSIVE URGENCY: ICD-10-CM

## 2024-02-06 PROBLEM — L30.9 DERMATITIS: Status: ACTIVE | Noted: 2023-12-12

## 2024-02-06 PROBLEM — M10.9 GOUT: Status: ACTIVE | Noted: 2022-01-11

## 2024-02-06 PROCEDURE — 99204 OFFICE O/P NEW MOD 45 MIN: CPT | Performed by: STUDENT IN AN ORGANIZED HEALTH CARE EDUCATION/TRAINING PROGRAM

## 2024-02-06 PROCEDURE — 3080F DIAST BP >= 90 MM HG: CPT | Performed by: STUDENT IN AN ORGANIZED HEALTH CARE EDUCATION/TRAINING PROGRAM

## 2024-02-06 PROCEDURE — 3074F SYST BP LT 130 MM HG: CPT | Performed by: STUDENT IN AN ORGANIZED HEALTH CARE EDUCATION/TRAINING PROGRAM

## 2024-02-06 RX ORDER — CARVEDILOL 6.25 MG/1
6.25 TABLET ORAL 2 TIMES DAILY WITH MEALS
Qty: 180 TABLET | Refills: 3 | Status: SHIPPED | OUTPATIENT
Start: 2024-02-06 | End: 2024-02-07

## 2024-02-06 RX ORDER — LISINOPRIL 2.5 MG/1
2.5 TABLET ORAL DAILY
Qty: 90 TABLET | Refills: 3 | Status: SHIPPED | OUTPATIENT
Start: 2024-02-06 | End: 2024-02-07

## 2024-02-06 ASSESSMENT — ENCOUNTER SYMPTOMS
PALPITATIONS: 0
WHEEZING: 0
FEVER: 0
NAUSEA: 0
CHILLS: 0
VOMITING: 0
SHORTNESS OF BREATH: 0
WEIGHT LOSS: 0
DIZZINESS: 0
HEADACHES: 0

## 2024-02-06 ASSESSMENT — FIBROSIS 4 INDEX: FIB4 SCORE: 1.82

## 2024-02-06 NOTE — PROGRESS NOTES
Subjective:     CC:  Diagnoses of Renal artery dissection (HCC), Screening for colorectal cancer, Hypertensive urgency, and Aortic ectasia (HCC) were pertinent to this visit.    HISTORY OF THE PRESENT ILLNESS: Patient is a 53 y.o. male. This pleasant patient is here today to establish care and discus    PMH HTN, elevated cholesterol/ triglyrcerides      2/3/2024 [facility]  Cc chest pressure  Workup- trop neg x 3, EKG showed without st segment changes , ct noted for thoracic aortic ectasia, focal nonocllusive dissecction of left renal artery. Per dc ed spoke with [facility] rec systolic bp < 140  Med: lisinopril and coreg     Has NOT taken lisinopril or coreg since hospita discharge. Home BP  ( 3x per day) x 3 days range in 100-110s  He decreased alcohol use and changed his diet.     Patients with renal infarction should be monitored for recurrent thromboembolic events, complications from therapy (eg, bleeding events), and for stabilization or deterioration of their kidney function. The time interval for follow-up may be variable depending upon the severity of the original event, risk of recurrence, and the degree of impairment in kidney function    Felt overwhelmed at working a [employer] station felt overwhelmed, felt strange, and blood pressure was higher than normal. Symptoms did not go away so decide to go to emergency room.     Health Maintenance: declined vaccines       ROS:   Review of Systems   Constitutional:  Negative for chills, fever and weight loss.   HENT:  Negative for hearing loss.    Respiratory:  Negative for shortness of breath and wheezing.    Cardiovascular:  Negative for chest pain and palpitations.   Gastrointestinal:  Negative for nausea and vomiting.   Genitourinary:  Negative for frequency and urgency.   Skin:  Negative for rash.   Neurological:  Negative for dizziness and headaches.         Objective:       Exam: BP (!) 110/90 (BP Location: Left arm, Patient Position: Sitting)   Pulse 79   Temp 36.4 °C  "(97.6 °F) (Temporal)   Ht 1.727 m (5' 8\")   Wt 67.6 kg (149 lb)   SpO2 99%  Body mass index is 22.66 kg/m².    Physical Exam  Constitutional:       Appearance: Normal appearance.   Cardiovascular:      Rate and Rhythm: Normal rate and regular rhythm.   Pulmonary:      Effort: Pulmonary effort is normal.      Breath sounds: Normal breath sounds.   Neurological:      Mental Status: He is alert.           Labs: renal function stable, cbc was fine, ldl < 100    Assessment & Plan:   53 y.o. male with the following -    1. Renal artery dissection (HCC)  Chronic, stable  BP < 130  Recommend taking coreg 6.25mg bid and lisinopril 2.5mg daily   - URIC ACID; Future  - MICROALBUMIN CREAT RATIO URINE; Future  - Basic Metabolic Panel; Standing  - carvedilol (COREG) 6.25 MG Tab; Take 1 Tablet by mouth 2 times a day with meals.  Dispense: 180 Tablet; Refill: 3  Lisinopril 2.5mg   - Referral to Vascular Medicine    2. Screening for colorectal cancer    - KELSIE (FIT DNA)    3. Hypertensive urgency  resolved  - carvedilol (COREG) 6.25 MG Tab; Take 1 Tablet by mouth 2 times a day with meals.  Dispense: 180 Tablet; Refill: 3    4. Aortic ectasia (HCC)  Chronic, stable  Recc BB daily   - EC-ECHOCARDIOGRAM COMPLETE W/O CONT; Future IN SIX MONTHS    Return in about 6 months (around 8/6/2024) for Lab review.    Please note that this dictation was created using voice recognition software. I have made every reasonable attempt to correct obvious errors, but I expect that there are errors of grammar and possibly content that I did not discover before finalizing the note.  "

## 2024-02-07 ENCOUNTER — OFFICE VISIT (OUTPATIENT)
Dept: VASCULAR LAB | Facility: MEDICAL CENTER | Age: 54
End: 2024-02-07
Attending: FAMILY MEDICINE
Payer: COMMERCIAL

## 2024-02-07 VITALS
HEIGHT: 68 IN | WEIGHT: 147 LBS | SYSTOLIC BLOOD PRESSURE: 132 MMHG | DIASTOLIC BLOOD PRESSURE: 92 MMHG | BODY MASS INDEX: 22.28 KG/M2 | HEART RATE: 70 BPM

## 2024-02-07 DIAGNOSIS — I10 PRIMARY HYPERTENSION: ICD-10-CM

## 2024-02-07 DIAGNOSIS — I77.73 DISSECTION OF LEFT RENAL ARTERY (HCC): ICD-10-CM

## 2024-02-07 DIAGNOSIS — E78.2 MIXED HYPERLIPIDEMIA: ICD-10-CM

## 2024-02-07 DIAGNOSIS — Z79.02 LONG TERM (CURRENT) USE OF ANTITHROMBOTICS/ANTIPLATELETS: ICD-10-CM

## 2024-02-07 DIAGNOSIS — I71.21 ANEURYSM OF ASCENDING AORTA WITHOUT RUPTURE (HCC): ICD-10-CM

## 2024-02-07 DIAGNOSIS — F10.10 ALCOHOL CONSUMPTION BINGE DRINKING: ICD-10-CM

## 2024-02-07 PROCEDURE — 3075F SYST BP GE 130 - 139MM HG: CPT | Performed by: FAMILY MEDICINE

## 2024-02-07 PROCEDURE — 99212 OFFICE O/P EST SF 10 MIN: CPT

## 2024-02-07 PROCEDURE — 3080F DIAST BP >= 90 MM HG: CPT | Performed by: FAMILY MEDICINE

## 2024-02-07 PROCEDURE — 99204 OFFICE O/P NEW MOD 45 MIN: CPT | Performed by: FAMILY MEDICINE

## 2024-02-07 RX ORDER — LOSARTAN POTASSIUM 25 MG/1
25 TABLET ORAL DAILY
Qty: 90 TABLET | Refills: 3 | Status: SHIPPED | OUTPATIENT
Start: 2024-02-07

## 2024-02-07 ASSESSMENT — ENCOUNTER SYMPTOMS
CLAUDICATION: 0
ORTHOPNEA: 0
NAUSEA: 0
PND: 0
WHEEZING: 0
FEVER: 0
ABDOMINAL PAIN: 0
VOMITING: 0
CHILLS: 0
SPUTUM PRODUCTION: 0
COUGH: 0
HEMOPTYSIS: 0
PALPITATIONS: 0
SHORTNESS OF BREATH: 0

## 2024-02-07 ASSESSMENT — FIBROSIS 4 INDEX
FIB4 SCORE: 1.82
FIB4 SCORE: 1.82

## 2024-02-07 NOTE — PROGRESS NOTES
VASCULAR MEDICINE CLINIC - INITIAL VISIT  24     Billy Ewing is a 53 y.o. male  who has been referred for vascular medicine evaluation and management   Referring provider: Blayne Villanueva M.D.     Subjective      L renal artery dissection  INITIAL VISIT HISTORY: admitted to [facility] 2/3/24 with chest pressure, r/o by EKG, TnI, normal stress test.  Had CTA during course showing AsAA and L renal art dissection.  Had consult with vasc surg who recommended tight BP control and no indication for surg mgmt per [facility] d/c summary reporting.  No formal surg consult while inpatient.    Had reported acute onset of anterior chest pressure while working, gradual onset w/o other associated sx.  Denies L flank pain, no hemturia or prior known renal dz  Denies fhx of connective tissue d/o or aneurysmal dz.      Thoracic Aortic Aneurysm  Type: ascending   Initial visit hx/sx: currently denies chest, back, abdominal pain, SOB, dysphagia, worsening cough, hemoptysis.  Pertinent pmhx:  2024 CTA = 4.2 cm  HTN: Yes, Details: intermittent  Hx of tobacco:   reports that he quit smoking about 17 years ago. His smoking use included cigarettes. He started smoking about 26 years ago. He has a 4.5 pack-year smoking history. He has never used smokeless tobacco.  Hx of connective tissue d/o (eg. Marfans, Wale-Danlos, Loeys-Federico): no  Hx of inflammatory / autoimmune vasculitis (Takayasu's arteritis, Giant Cell arteritis): no   Hx of infective aortitis, HIV, syphilis: no   Hx of MVA, chest wall trauma, deceleration injuries: no   Hx of Biscuspid Aortic Valve:  no prior echo   Hx of anabolic steroid use or legal or illicit stimulants: no  Pertinent famhx:  Connective tissue disorders: no   Aneurysmal disease or known hereditary thoracic aneurysmal disease: no     HTN: started on acei and BB at [facility] d/c but has stopped x 3 days. Home BP los/70s (off meds x 2d)     HLD: Stable, no current medications   Reports intermittent binge  drinking , no prior acute panc   Latest Reference Range & Units 02/04/24 04:09   Cholesterol,Tot 100 - 199 mg/dL 215 (H)   Triglycerides 0 - 149 mg/dL 303 (H)   HDL >=40 mg/dL 64   LDL <100 mg/dL 90      Patient Active Problem List    Diagnosis Date Noted    Aneurysm of ascending aorta without rupture (HCC) 02/07/2024    Mixed hyperlipidemia 02/07/2024    Primary hypertension 02/07/2024    Long term (current) use of antithrombotics/antiplatelets 02/07/2024    Dissection of left renal artery (HCC) 02/04/2024    Alcohol consumption binge drinking 02/04/2024    Dermatitis 12/12/2023    Gout 01/11/2022      Past Surgical History:   Procedure Laterality Date    BRCA1&2 GEN FULL SEQ DUP/DEL        Family History   Problem Relation Age of Onset    Heart Attack Mother 75        cardiac cath, ? MI    Hypertension Mother     Other Father         aspiration PNA    No Known Problems Sister     No Known Problems Sister     Other Brother         unknown cause of death    No Known Problems Maternal Grandmother     No Known Problems Maternal Grandfather     No Known Problems Paternal Grandmother     No Known Problems Paternal Grandfather     Cancer Neg Hx     Ovarian Cancer Neg Hx     Tubal Cancer Neg Hx     Breast Cancer Neg Hx     Colorectal Cancer Neg Hx     Peritoneal Cancer Neg Hx     Arterial Aneurysm Neg Hx       Current Outpatient Medications on File Prior to Visit   Medication Sig Dispense Refill    carvedilol (COREG) 6.25 MG Tab Take 1 Tablet by mouth 2 times a day with meals. 180 Tablet 3    lisinopril (PRINIVIL) 2.5 MG Tab Take 1 Tablet by mouth every day. 90 Tablet 3    multivitamin Tab Take 1 Tablet by mouth every day.       No current facility-administered medications on file prior to visit.      ALLERGIES  Patient has no known allergies.    Social History     Tobacco Use    Smoking status: Former     Current packs/day: 0.00     Average packs/day: 0.5 packs/day for 8.9 years (4.5 ttl pk-yrs)     Types: Cigarettes      "Start date: 1997     Quit date: 2006     Years since quittin.6    Smokeless tobacco: Never    Tobacco comments:     Social Smoker   Vaping Use    Vaping Use: Never used   Substance Use Topics    Alcohol use: Yes     Alcohol/week: 10.2 oz     Types: 12 Glasses of wine, 5 Cans of beer per week     Comment: working on cutting down    Drug use: Never     DIET AND EXERCISE:  Weight Change:none   Diet: common adult  Exercise: minimal exercise     Review of Systems   Constitutional:  Negative for chills, fever and malaise/fatigue.   Respiratory:  Negative for cough, hemoptysis, sputum production, shortness of breath and wheezing.    Cardiovascular:  Negative for chest pain, palpitations, orthopnea, claudication, leg swelling and PND.   Gastrointestinal:  Negative for abdominal pain, nausea and vomiting.          Objective    Vitals:    24 1413 24 1416   BP: (!) 141/89 (!) 132/92   BP Location: Left arm Left arm   Patient Position: Sitting Sitting   BP Cuff Size: Adult Adult   Pulse: 66 70   Weight: 66.7 kg (147 lb) 66.7 kg (147 lb)   Height: 1.727 m (5' 8\") 1.727 m (5' 8\")      BP Readings from Last 4 Encounters:   24 (!) 132/92   24 (!) 110/90   24 126/78      Body mass index is 22.35 kg/m².   Wt Readings from Last 4 Encounters:   24 66.7 kg (147 lb)   24 67.6 kg (149 lb)   24 67 kg (147 lb 11.3 oz)      Physical Exam  Constitutional:       General: He is not in acute distress.     Appearance: Normal appearance. He is not diaphoretic.   HENT:      Head: Normocephalic and atraumatic.   Eyes:      Conjunctiva/sclera: Conjunctivae normal.   Cardiovascular:      Rate and Rhythm: Normal rate and regular rhythm.      Heart sounds: Normal heart sounds.   Pulmonary:      Effort: Pulmonary effort is normal.      Breath sounds: Normal breath sounds.   Abdominal:      General: There is no distension.      Palpations: There is no mass.      Tenderness: There is no abdominal " "tenderness.   Skin:     General: Skin is warm and dry.   Neurological:      Mental Status: He is alert and oriented to person, place, and time.      Cranial Nerves: No cranial nerve deficit.      Gait: Gait is intact.   Psychiatric:         Mood and Affect: Mood and affect normal.          DATA REVIEW    Lab Results   Component Value Date/Time    CHOLSTRLTOT 215 (H) 02/04/2024 04:09 AM    LDL 90 02/04/2024 04:09 AM    HDL 64 02/04/2024 04:09 AM    TRIGLYCERIDE 303 (H) 02/04/2024 04:09 AM       Lab Results   Component Value Date/Time    SODIUM 135 02/03/2024 09:22 PM    POTASSIUM 3.7 02/03/2024 09:22 PM    CHLORIDE 95 (L) 02/03/2024 09:22 PM    CO2 24 02/03/2024 09:22 PM    GLUCOSE 90 02/03/2024 09:22 PM    BUN 17 02/03/2024 09:22 PM    CREATININE 0.87 02/03/2024 09:22 PM     Lab Results   Component Value Date/Time    ALKPHOSPHAT 70 02/03/2024 09:22 PM    ASTSGOT 39 02/03/2024 09:22 PM    ALTSGPT 22 02/03/2024 09:22 PM    TBILIRUBIN 0.5 02/03/2024 09:22 PM       No results found for: \"HBA1C\"    No results found for: \"MICROALBCALC\", \"MALBCRT\", \"MALBEXCR\", \"UCIHYS74\", \"MICROALBUR\", \"MICRALB\", \"UMICROALBUM\", \"MICROALBTIM\"      Cardiovascular Imaging:    CTA complete Ao 2/3/24  Aorta: Pre-contrast images demonstrate no evidence of displaced calcified intima or intramural hematoma.  Aortic arch: Branching pattern is conventional.  Abdominal aorta: Mild atherosclerotic changes.  Diameter: Ascending aorta measures 4.2 cm in diameter.  Descending thoracic aorta measures 2.4 cm in diameter.  Dissection: Absent.  Celiac artery origin: patent.  Superior mesenteric artery origin: patent.  Renal artery origins: Renal artery origins are patent.  RIGHT renal artery is normal in caliber.  LEFT renal artery shows nonocclusive dissection.  Inferior mesenteric artery: Patent.  Common iliac arteries: Nonaneurysmal and patent.   Heart: Normal size. No pericardial effusion. Coronary artery origins are conventional.  IMPRESSION:   1.  " Ascending thoracic aortic ectasia measuring 4.2 cm in diameter.  2.  No thoracic or abdominal aortic dissection.  3.  Focal nonocclusive dissection involving LEFT renal artery.  4.  Scarring at the midportion LEFT kidney.  5.  No gross evidence for pulmonary emboli.    NM stress test 2/4/24   NUCLEAR IMAGING INTERPRETATION   No evidence of significant jeopardized viable myocardium or prior myocardial    infarction.   Normal left ventricular size, ejection fraction, and wall motion.   ECG INTERPRETATION   Negative stress ECG for ischemia.          Medical Decision Making:  Today's Assessment / Status / Plan:     1. Aneurysm of ascending aorta without rupture (HCC)  EC-ECHOCARDIOGRAM COMPLETE W/O CONT    CT-CTA COMPLETE THORACOABDOMINAL AORTA    CRP HIGH SENSITIVE (CARDIAC)      2. Dissection of left renal artery (HCC)  CT-CTA COMPLETE THORACOABDOMINAL AORTA      3. Mixed hyperlipidemia  APOLIPOPROTEIN B    Comp Metabolic Panel    Lipid Profile    CRP HIGH SENSITIVE (CARDIAC)      4. Primary hypertension  CRP HIGH SENSITIVE (CARDIAC)    Lipoprotein (a)    MICROALBUMIN CREAT RATIO URINE    TSH WITH REFLEX TO FT4    URINALYSIS    CBC WITHOUT DIFFERENTIAL    ALDOSTERONE    RENIN ACTIVITY      5. Long term (current) use of antithrombotics/antiplatelets        6. Alcohol consumption binge drinking           Etiology of Established CVD if Present:     1) Ascending aortic aneurysm - stable, asymptomatic  2/2024 CTA = 4.2cm,  estimated normal <3.9cm   - as reviewed with pt, surg repair indicated if 5.5+cm or rapid expansion rate (defined as >0.5 cm in 1 year or >0.3 cm/y in 2 consecutive years for those with sporadic aneurysms, and >0.3 cm in 1 year for those with hereditary thoracic Ao disease (HTAD) or bicuspid AV) due to increase risks for dissection/rupture  - no known HTAD, so presumed sporadic development from cystic medial degeneration  - No reported fhx or pmhx of connective tissue disease  - no echo to date   - at  initial visit - reviewed anatomy, risks, emergency s/s requiring immediate attention, and thresholds for surgical intervention and gave handout   Plan:   - continue med mgmt   - recommend 1st degree relatives get screened with echo for TAA as recommended by 2022 ACC guideline  - check echo to r/o expansion and bicuspid Ao valve   - due to co-morbid L renal art dissection, will be getting 3mo interval CTA complete Ao   - if normal exams, then repeat annual echo surveillance, and consider CTA Q3-5yr or if indications of rapid expansion noted  - focus on ARB and BB for BP control  - avoid all tobacco products and prescription or illicit stimulants   - activity restrictions include no long duration high intensity cardio, extreme endurance activities, high-intensity strength training >20-30lbs, and avoid straining or holding breath when lifting    2) asymptomatic, isolated Left renal artery dissection - stable, no indications of renal infarction or malperfusion - asymptomatic and normal renal function, good patency of artery    - this is a rare condition and found incidentally, no preceding abd or flank pain and fortunately no associated renal infarction from occlusive dz or impact on overall renal function   - unclear etiology, has low prob connective tissue disorder due to lack of fhx, though we can still consider further w/u with aortopathy panel in future in light of co-morbid AsAA above   - will consider large/medium vessel vasculitis, though no indications per CTA   - if propagation/expansion and/or transition to symptomatic, may be surgical candidate  - at this time, we hope for favorable arterial remodeling and healing and will monitor over time while maintain good BP control and monitoring renal function    Plan  - monitor for new onset of L flank pain, hematuria   - update labs in 6 weeks including ESR   - repeat CTA for surveillane in 3mo (May), then determine if further surveillance based upon healed vs  "chronic dissection   - consider vasc surg eval if progressive sx     LIPID MANAGEMENT  Qualifies for Statin Therapy Based on 2018 ACC/AHA Guidelines: yes, Primary Prevention - 40-76yo, LDLc >70, <190 w/o DM  10-yr ASCVD risk score: The 10-year ASCVD risk score (Ernie MENDOZA, et al., 2019) is: 5%,  5 - 7.5% \"borderline risk\"  Major ASCVD events: None    High-risk conditions: N/A  Risk-enhancers: Persistently elevated trigs >174  Currently on Statin: No  Tx goals: due to HTG >200, use apoB <90 and/or non-HDL-C <130  At goal? No for non-HDL, yes for LDL   Plan:   - check advanced markers and apoB   - - low threshold to start at least moderate-intensity statin       BLOOD PRESSURE MANAGEMENT  Office BP Goal ACC/AHA (2017) goal <130/80  Home BP at goal:  yes  Office BP at goal:  no  24h ABPM:  not ordered to date   RDN candidate? NO  BP meds recommended for reduction of expansion rate of TAA:   -Beta-blocker (anti-impulse therapy to decrease pulsatile wall stress)   - ARB (inhibits TGF-beta activity in aortic wall,, most widely studied is losartan)    Plan:   Monitoring:   - start/continue home BP monitoring, reviewed correct technique, provide BP log and instructions  - monitor for spiking   Medications:  - stop lisinopril, start losartan 25mg daily   - stop carvedilol for now     GLYCEMIC MANAGEMENT Normal    ANTITHROMBOTIC THERAPY   - start ASA 81mg daily     LIFESTYLE INTERVENTIONS:    Tobacco Use: Stages of Change: Maintenance (sustained change >6mo)     reports that he quit smoking about 17 years ago. His smoking use included cigarettes. He started smoking about 26 years ago. He has a 4.5 pack-year smoking history. He has never used smokeless tobacco.   - continued complete avoidance of all tobacco products     Physical Activity: as noted above, recommend for moderate intensity activity daily     Weight Management and/or Nutrition Mediterranean style dietary approach , Triglyceride-lowering diet with reduced fat " calories, reduced simple carbohydrates, reduce/avoid alcohol, and Provide specific dietary approach handouts      OTHER:     # binge drinking - reflected in high TG, advised to reduce to 2 or less/day, avoid binges     Studies to Be Obtained: echo - now;  CTA complete 5/2024 - both ordered, RN to track   Labs to Be Obtained: as noted in assessment     Follow up in: 6 weeks    Rickie Hamilton M.D.   [facility]  [facility]  [phone]    Cc:

## 2024-03-13 ENCOUNTER — TELEPHONE (OUTPATIENT)
Dept: VASCULAR LAB | Facility: MEDICAL CENTER | Age: 54
End: 2024-03-13
Payer: COMMERCIAL

## 2024-03-13 NOTE — TELEPHONE ENCOUNTER
Established patient  Chart prep for upcoming appointment with Dr. Hamilton    Any pending/incomplete orders from last visit? Yes Imaging and labs, patient reminded to try to complete prior to appointment  Were any records requested?  No  Correct appointment type (New/Established/virtual)? Yes  Referral up to date? Yes  Referral attached to appointment (renewals and New patients only)? N/A (established with up-to-date referral)

## 2024-03-18 ENCOUNTER — DOCUMENTATION (OUTPATIENT)
Dept: VASCULAR LAB | Facility: MEDICAL CENTER | Age: 54
End: 2024-03-18
Payer: COMMERCIAL

## 2024-03-18 NOTE — PROGRESS NOTES
Billy Ewing  No showed to follow-up with the vascular medicine clinic.     Scheduling staff will contact to reschedule WITH ANY AVAILABLE PROVIDER and remind patient of the importance of maintaining appointments as scheduled or to contact our office at least 24 hours in advance if they need to reschedule.       Patient should be aware of the potential for worsening conditions without appropriate follow-up and monitoring.      Vascular Medicine Clinic   [facility]   [phone]

## 2024-07-23 ENCOUNTER — TELEPHONE (OUTPATIENT)
Dept: VASCULAR LAB | Facility: MEDICAL CENTER | Age: 54
End: 2024-07-23
Payer: COMMERCIAL